# Patient Record
Sex: FEMALE | Race: WHITE | NOT HISPANIC OR LATINO | URBAN - METROPOLITAN AREA
[De-identification: names, ages, dates, MRNs, and addresses within clinical notes are randomized per-mention and may not be internally consistent; named-entity substitution may affect disease eponyms.]

---

## 2021-02-10 ENCOUNTER — TELEPHONE (OUTPATIENT)
Dept: SURGICAL ONCOLOGY | Facility: CLINIC | Age: 66
End: 2021-02-10

## 2021-02-10 NOTE — TELEPHONE ENCOUNTER
Genetics New Patient Intake Form    Patient Details:      Ulysses Alegria     1955     [id number]     Background Information:         Who is calling to schedule?                                            self    If not self, relationship to patient? Referring Provider Sister     Is the referral marked STAT No    Is patient newly diagnosed with cancer, have metastatic disease, or pending surgery? No    If yes, which is it?  na    If the patient is pending surgery Needs to be scheduled within 48 hours  If none available, schedule patient then email Stat cancer genetics    If the patient is metastatic or newly diagnosed Needs to be scheduled within 2 weeks  If none available, schedule patient then email Stat cancer genetics    Have you had genetic testing that showed a positive genetic mutation? (If yes, schedule within 2 weeks or email STAT cancer genetics) No    Has your family member had genetic testing that resulted in a positive genetic mutation? (If yes in the last 6 months, schedule within 3 weeks )  (If yes but over 6 months, schedule as usual) Yes   Cipriano Naqvi  Confirmation number- [id number]    Is this a personal or family history?  family    What is the type of tumor? Pancreatic and breast    Scheduling Information:    Preferred Burt:  Any         Are there any dates/time the patient cannot be seen? No    Did the patient schedule an appointment? Yes    If yes, list appointment date and provider name 2/17 [facility]     If no, briefly state why na    Miscellaneous: This is a virtual visit pt lives in [place]   Sister just received her genetic testing results today 2/10    After completing the above information, please route to Oncology Genetics

## 2021-02-11 ENCOUNTER — TELEPHONE (OUTPATIENT)
Dept: GENETICS | Facility: CLINIC | Age: 66
End: 2021-02-11

## 2021-02-18 ENCOUNTER — CLINICAL SUPPORT (OUTPATIENT)
Dept: GENETICS | Facility: CLINIC | Age: 66
End: 2021-02-18

## 2021-02-18 DIAGNOSIS — Z80.3 FAMILY HISTORY OF BREAST CANCER: ICD-10-CM

## 2021-02-18 DIAGNOSIS — Z80.0 FAMILY HISTORY OF PANCREATIC CANCER: ICD-10-CM

## 2021-02-18 DIAGNOSIS — Z84.81 FAMILY HISTORY OF GENETIC DISEASE CARRIER: Primary | ICD-10-CM

## 2021-02-18 PROCEDURE — NC001 PR NO CHARGE: Performed by: GENETIC COUNSELOR, MS

## 2021-02-18 NOTE — PROGRESS NOTES
Pre-Test Genetic Counseling Consult Note    Patient Name: Lin Cardenas   /Age: [age] y o  Referring Provider: Self-referred    Date of Service: 2021  Genetic Counselor: Beth Tavera MS, [address] [facility] [place] [facility] [place]  Interpretation Services: None  Location: Telephone consult   Length of Visit: 60 minutes      Britany Laird was referred to the [address] [place] [address] and Genetic Assessment Program due to her family history of breast and pancreatic cancer and familial BRCA2 mutation  She presents today to discuss the possibility of a hereditary cancer syndrome, options for genetic testing, and implications for her and her family  Cancer History and Treatment:     Personal History: No personal history of cancer    Screening Hx:   Breast:  Mammogram: Annual   Breast biopsy: None     Colon:  Colonoscopy: Routine; most recent 2016; repeat in 10 years   1-2 colon polyps reported    Gynecologic:  Pelvic/Pap exam: Routine   Ovaries/Uterus: Intact    Skin:  Skin cancer screening: Annual with a dermatologist     Reproductive History  Age at menarche: 10y  Parity:   Fertility Medication: None   Age at first live birth: 36y  : Yes  Oral Contraception: 10+ years   Menopause: Post-menopausal; natural 42  Hormone replacement: None     Medical and Surgical History  Pertinent surgical history: No past surgical history on file  Pertinent medical history:No past medical history on file  Other History:  Height:   Ht Readings from Last 1 Encounters:   No data found for Ht     Weight:   Wt Readings from Last 1 Encounters:   No data found for Wt     Relevant Family History   Patient reports no Ashkenazi Rastafari ancestry       - Sister- Age 61 with TNBC at the age of 40 and pancreatic cancer at the age of 61; she underwent a [facility]li[facility]gene panel [facility] and was found to carry a BRCA2 c 3635delA (p [id number]*16) pathogenic variant and an APC X 1828R>R variant of uncertain significance    - Mother: Age 80 with no history of cancer  The only history of cancer in maternal relatives is a maternal first-cousin (male) with leukemia in his 63's    - Father: Age 80; Ang Hayes has limited contact with her father  - Paternal Uncle:  with stomach cancer  - Paternal Grandfather:  with stomach cancer  - Paternal Grandmother:  with breast cancer age of diagnosis not known     Please refer to the scanned pedigree in the Media Tab for a complete family history     *All history is reported as provided by the patient; records are not available for review, except where indicated  Assessment:  1  Meets NCCN V2 2021 Testing Criteria for High-Penetrance Breast and/or Ovarian Cancer Susceptibility Genes   - Individuals with any blood relative with a known pathogenic/likely pathogenic variant in a cancer susceptibility gene  Specifically, Lory's sister carries a BRCA2 c 3635delA (p [id number]*16) pathogenic variant      We explained that the likelihood that Ang Hayes harbors the same BRCA2 pathogenic variant identified in her sister is 50%  We reviewed the cancer risks associated with BRCA2 pathogenic variants as well as the NCCN screening recommendations and risk-reduction options available if she tests positive for the familial variant  Genetic testing for hereditary cancer is available via single gene analysis or panel testing of multiple genes associated with an increased risk of cancer  Lory's maternal family history is not concerning for hereditary cancer therefore single site analysis is recommended  Plan:     Summary: Genetic testing is indicated for Ang Hayes  Sample Collection: A saliva kit was mailed home to the patient     Genetic Testing Preformed: Single Site Testing for the BRCA2 c 3635delA (p [id number]*16) pathogenic variant      Genetic Testing Lab: [facility]    Results take approximately 2-3 weeks to complete once test is started  We will contact Ang Hayes once results are available

## 2021-03-12 ENCOUNTER — TELEPHONE (OUTPATIENT)
Dept: GENETICS | Facility: CLINIC | Age: 66
End: 2021-03-12

## 2021-03-12 NOTE — TELEPHONE ENCOUNTER
Post-Test Genetic Counseling Consult Note  Today I spoke with Lizy Nair over the phone to review the results of her genetic test for hereditary cancer  We met previously on 2/18/2021 for pre-test counseling  SUMMARY:    Test(s): Single Site Testing for the BRCA2 c 3635delA (p [id number]*16) pathogenic variant      Result: Positive     Variant 1:   BRCA2 c 3635delA (p [id number]*16) pathogenic variant      Assessment:     Variant 1: BRCA2 c 3635delA (p [id number]*16) pathogenic variant      The BRCA2 gene is associated with autosomal dominant hereditary breast and ovarian cancer (HBOC) syndrome ([employer] UID: [id number]) and autosomal recessive Fanconi anemia, type D1 (FA-D1) (Estela Morgan: [id number])  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  Hereditary breast and ovarian cancer (HBOC) syndrome    Women with a single BRCA2 pathogenic variant have approximately a 40-85% lifetime risk of breast cancer  The risk for a second primary breast cancer within 5 years of the first breast cancer diagnosis is approximately 6 8-9%  Women also have up to a 17-27% lifetime risk for ovarian, fallopian tube, or peritoneal cancer to age 79  Men with a BRCA2 pathogenic variant have a 7-8% risk for breast cancer and a 20% risk for prostate cancer  Men and women also have an elevated risk for melanoma and pancreatic cancer although the exact lifetime risk is not known  Reproductive Risks:  Individuals with a single BRCA2 pathogenic variant may also be at risk to have a child with Fanconi Anemia if their partner also carries a BRCA2 variant  Fanconi anemia is characterized by bone marrow failure, short stature, abnormal skin pigmentation, developmental delay and malformations of the thumbs, skeletal and central nervous systems  Individuals with Fanconi anemia also have an increased risk for leukemia and early onset solid tumors    In some cases, it may be appropriate for a partner to undergo genetic testing to better understand the risk of having a child with this condition  If there is concern for reproductive risk, this information should be discussed with a health care provider or genetic counselor  Risks for Family Members: This test result may help clarify the risk for other family members to develop cancer  All first-degree relatives (parents, siblings and children) have up to a 50% chance of having the pathogenic variant  Other relatives such as aunts, uncles and cousins may also be at risk  We discussed that each of Tiffanie Martinez 's children have a 50% chance to inherit this BRCA2 pathogenic  We discussed that the only way to determine which side of the family this pathogenic variant is coming from is to test Lory's mother and father  We do suspect that the variant is coming from Lory's father based on the reported family history  Tiffanie Martinez asked about testing for her paternal half-siblings and we encouraged them to consider testing and explained that they too can get free family member testing for up to 180 days from 2/9/21  We encouraged Tiffanie Martinez  to discuss this information with her relatives  If Tiffanie Martinez family members have any questions or are interested in testing they can reach out to the [facility] Genetics number at [phone] for additional information  Additional Information:  A healthy lifestyle will improve overall health and reduce risk for illness  Eating a healthy diet and exercising for 4 hours per week is recommended  Both diet and exercise have been shown to help maintain a healthy weight  Postmenopausal women who are overweight are at higher risk for breast cancer  Moderate to heavy alcohol use can increase the risk for some cancers  Smoking cigarettes can also increase risk for breast, lung, prostate, pancreatic and other cancers        Management:  Management guidelines for individuals with pathogenic and likely pathogenic BRCA2 variants have been developed by the [facility]  Please refer to the current NCCN guidelines for the most up to date guidelines  The recommendations listed below are specific to Jules Proctor and are based on recommendations in the V2 2020 The Genetic/Familial High-Risk Assessment: Breast, Ovarian and Pancreatic Guideline  These recommendations are subject to change over time and the newest guidelines should be referenced for the most up to date recommendations  Plan:    WOMEN    Breast Cancer Screening- We encouraged Jules Proctor to meet with a breast specialist in order to discuss these screening recommendations along with the pro's and con's of a risk-reducing mastectomy     -Breast awareness starting at age 25 y  -Clinical breast exam every 6-12 mo, starting at age 22 y     -Age 20-27 y, annual breast MRI screening with contrast (or mammogram with consideration of tomosynthesis, only if MRI is unavailable) or individualized based on family history if a breast cancer diagnosis before 27 is present     -Age 34-75y, annual mammogram with consideration of tomosynthesis and breast MRI screening with contrast    -Age ? 76 y, management should be considered on an individual basis     -Discuss option of risk-reducing mastectomy    -For women with a BRCA pathogenic/likely pathogenic variant who are treated for breast cancer and have not had a bilateral mastectomy, screening with annual mammogram and breast MRI should continue as described above  Gynecologic Cancer Screening- We encouraged Jules Proctor to meet with a gynecologic oncologist in order to discuss the risk-reducing salpingo-oophorectomy    Ovarian Cancer    -Recommend risk-reducing salpingo-oophorectomy (RRSO), typically between 28 and 40y, and upon completion of child bearing   See Risk-Reducing Salpingo-Oophorectomy (RRSO) Protocol in NCCN Guidelines for Ovarian Cancer - Principles of Surgery     -Because onset of ovarian cancer in individuals with BRCA2 pathogenic/likely pathogenic variants is an average 8-10 years later than individuals with pathogenic/likely pathogenic BRCA1 variants it's reasonable to delay RRSO for management of ovarian cancer risk until 40-45y in patients with BRCA2 pathogenic/likely pathogenic variants unless age at diagnosis in the family warrants earlier age for consideration of prophylactic surgery     -Salpingectomy alone is not the standard of care for risk reduction, although clinical trials of interval salpingectomy and delayed oophorectomy are ongoing  The concern for risk-reducing salpingectomy alone is that women are still at risk for developing ovarian cancer  In addition, in premenopausal women, oophorectomy likely reduces the risk of developing breast cancer but the magnitude is uncertain and may be gene-specific     -For those patients who have not elected RRSO, transvaginal ultrasound combined with serum CA-125 for ovarian cancer screening, although of uncertain benefit, may be considered at the clinician's discretion starting at age 32-31 y  Skin Cancer Screening    -No specific screening guidelines exist for melanoma, but general melanoma risk management is appropriate, such as annual full-body skin examination and minimizing UV exposure  Pancreatic Cancer Screening- We encouraged Aayush Jimenez to meet with a healthcare provider who specializes in pancreatic cancer screening and is familiar with BRCA2 pathogenic variants      -NCCN says consider pancreatic cancer screening beginning at age 48 (or 8 years younger than earliest exocrine pancreatic cancer diagnosis in the family) for individuals with a BRCA2 mutation and a first-degree relative with pancreatic cancer  Screening uses annual contrast-enhanced MRI/MRCP and /or EUS with shorter screening intervals based on clinical judgement  Other Screening:    Colon Cancer Screening  Population-based screening guidelines are appropriate       Aayush Jimenez asked that we fax a copy of her genetic test result and this consult note to her healthcare provider Rosie ROMAN at [facility] in [address] [phone] and F: [phone]  I will also look up a contact for [facility] in [address] [place] [address] area to provide some recommendations for specialists         Summary:   Positive Result: Torres Rubio was strongly encouraged to follow up on with our office on an annual basis to review the most up to date guidelines as recommendations are subject to change over time